# Patient Record
Sex: FEMALE | Race: OTHER | NOT HISPANIC OR LATINO | URBAN - METROPOLITAN AREA
[De-identification: names, ages, dates, MRNs, and addresses within clinical notes are randomized per-mention and may not be internally consistent; named-entity substitution may affect disease eponyms.]

---

## 2021-01-30 ENCOUNTER — EMERGENCY (EMERGENCY)
Facility: HOSPITAL | Age: 41
LOS: 1 days | Discharge: HOME | End: 2021-01-30
Attending: EMERGENCY MEDICINE | Admitting: EMERGENCY MEDICINE
Payer: SELF-PAY

## 2021-01-30 VITALS
OXYGEN SATURATION: 98 % | TEMPERATURE: 98 F | RESPIRATION RATE: 18 BRPM | DIASTOLIC BLOOD PRESSURE: 80 MMHG | SYSTOLIC BLOOD PRESSURE: 154 MMHG | HEART RATE: 68 BPM

## 2021-01-30 PROCEDURE — 71046 X-RAY EXAM CHEST 2 VIEWS: CPT | Mod: 26

## 2021-01-30 PROCEDURE — 99283 EMERGENCY DEPT VISIT LOW MDM: CPT

## 2021-01-30 RX ORDER — LIDOCAINE 4 G/100G
1 CREAM TOPICAL ONCE
Refills: 0 | Status: COMPLETED | OUTPATIENT
Start: 2021-01-30 | End: 2021-01-30

## 2021-01-30 RX ORDER — FAMOTIDINE 10 MG/ML
20 INJECTION INTRAVENOUS ONCE
Refills: 0 | Status: COMPLETED | OUTPATIENT
Start: 2021-01-30 | End: 2021-01-30

## 2021-01-30 RX ADMIN — FAMOTIDINE 20 MILLIGRAM(S): 10 INJECTION INTRAVENOUS at 14:26

## 2021-01-30 RX ADMIN — Medication 30 MILLILITER(S): at 14:26

## 2021-01-30 RX ADMIN — LIDOCAINE 1 PATCH: 4 CREAM TOPICAL at 14:26

## 2021-01-30 NOTE — ED PROVIDER NOTE - ATTENDING CONTRIBUTION TO CARE
I have seen and examined the patient on the patient´s visit date. I have reviewed the note written by Orlando Sexton MD  on that visit day. I have supervised and participated as necessary in the performance of procedures indicated for patient management and was available at all phases of the patient´s visit when needed. We discussed the history, physical exam findings, management plan, and  medical decision making. I have made my additions, exceptions, and revisions within the chart and I agree with H and P as documented in its entirety. The data and my interpretation of any data collected from labs, interventions and imaging appear below as well as my independent medical decision making and considerations    The patient is a 40y Female who has no pertinent past medical history PTED with post back/lat chest wall pain radiating to chest as described  Vital Signs Signs  PE: as described; my additions and exceptions are noted in the chart  DATA:  EKG: NSR@71; ORIANA; EKG reading ? age septal infarct which is new c/w 2016 EKG but on review appears unchanged  LABS: All significant/relevant labs and imaging results present during the time of evaluation have been entered into chart through pullset function and are discussed below    MDM:   IMPRESSION: MSK back pain chest pain; primarily back pain radiating to chest from intercostals vs neck  Considerations; no risk factors for ACS; pain doesn't have any exacerbation with usual cardiac precipitants     PLAN  Reassurance  MSK tx (analgesics=NSAID)  d/c with followup   RTED PRN

## 2021-01-30 NOTE — ED PROVIDER NOTE - NSFOLLOWUPCLINICS_GEN_ALL_ED_FT
[facility]  Neurology  [address] [place] [address] [address]rd [place] [address]  Phone: [phone]  Fax:   Follow Up Time:

## 2021-01-30 NOTE — ED PROVIDER NOTE - CROS ED ENDOCRINE ALL NEG
History Of Present Illness


Patient is a 73 year old female with a past medical history of hypertension and 

A Fib s/p pacemaker placement, who presents complaining of palpitations, onset 

this morning. States she was at rest, eating breakfast when symptoms began. 

Patient reports feeling as if her heart was pounding, with slight chest pain 

and shortness of breath. Currently, she complains of pain to the left shoulder/

arm. 





PMD: Dr. Derek Archuleta 


Time Seen by Provider: 02/11/18 11:26


Chief Complaint (Nursing): Palpitations


History Per: Patient


History/Exam Limitations: no limitations


Onset/Duration Of Symptoms: Sudden Onset (this AM)


Associated Symptoms: Chest Pain





Past Medical History


Reviewed: Historical Data, Nursing Documentation, Vital Signs


Vital Signs: 


 Last Vital Signs











Temp  98.6 F   02/11/18 11:25


 


Pulse  68   02/11/18 11:25


 


Resp  20   02/11/18 11:25


 


BP  159/70 H  02/11/18 11:25


 


Pulse Ox  96   02/11/18 12:29














- Medical History


PMH: Anxiety, Asthma, Atrial Fibrillation, Cardia Arrhythmia, Depression, 

Gastritis, HTN, Hypercholesterolemia


   Denies: Chronic Kidney Disease


Surgical History: Cholecystectomy, Pacemaker (6/16/16)





- [facility] Procedures








CATARAC PHACOEMULS/ASPIR (07/21/15)


INSERT LENS AT CATAR EXT (07/21/15)


INSERT OF MONITOR DEV INTO CHEST SUBCU/FASCIA, PERC APPROACH (06/14/16)


VACCINATION NEC (09/26/14)








Family History: States: Unknown Family Hx





- Social History


Hx Tobacco Use: No


Hx Alcohol Use: No


Hx Substance Use: No





- Immunization History


Hx Tetanus Toxoid Vaccination: No


Hx Influenza Vaccination: Yes


Hx Pneumococcal Vaccination: Yes





Review Of Systems


Except As Marked, All Systems Reviewed And Found Negative.


Constitutional: Negative for: Fever


Cardiovascular: Positive for: Chest Pain, Palpitations


Respiratory: Positive for: Shortness of Breath


Musculoskeletal: Positive for: Shoulder Pain (left)


Neurological: Negative for: Weakness, Headache, Dizziness





Physical Exam





- Physical Exam


Appears: Non-toxic, No Acute Distress


Skin: Normal Color, Warm, Dry


Head: Atraumatic, Normacephalic


Eye(s): bilateral: Normal Inspection, PERRL, EOMI


Nose: Normal


Oral Mucosa: Moist


Chest: Symmetrical


Cardiovascular: Rhythm Regular (and rate), No Murmur


Respiratory: Normal Breath Sounds, No Accessory Muscle Use, No Wheezing


Gastrointestinal/Abdominal: Normal Exam, Soft, No Tenderness


Extremity: Normal ROM, No Deformity, Swelling (+1 pitting edema bilaterally)


Pulses: Left Dorsalis Pedis: Normal, Right Dorsalis Pedis: Normal


Neurological/Psych: Oriented x3, Normal Speech





ED Course And Treatment





- Laboratory Results


Result Diagrams: 


 02/11/18 11:48





 02/11/18 11:48


ECG: Interpreted By Me, Viewed By Me


ECG Interpretation: Normal


Interpretation Of ECG: Normal sinus rhythm, 74 bpm, with no ST depressions or 

elevations


O2 Sat by Pulse Oximetry: 96 (RA)


Pulse Ox Interpretation: Normal





Medical Decision Making


Medical Decision Making: 





Time: 11:41


Paged the office of Dr. Archuleta, and spoke to Nurse Mensah, who recommends that if 

patient needs admission, should go to Dr. Soto's service. 





Time: 11:36


* CMP


* Pro BNP


* Troponin I


* CBC





12:24


Discussed case with Dr. Soto, patient will be hospitalized for observation 

under his service. 





Disposition


Discussed With : Alton Soto Jr.


Counseled Patient/Family Regarding: Studies Performed, Diagnosis





- Disposition


Disposition: HOSPITALIZED


Disposition Time: 12:25


Condition: GUARDED


Forms:  [facility] (English)





- Clinical Impression


Clinical Impression: 


 Chest pain, Palpitations








- Scribe Statement


The provider has reviewed the documentation as recorded by the Lisette Villegas





Provider Attestation: 





All medical record entries made by the Josselynibe were at my direction and 

personally dictated by me. I have reviewed the chart and agree that the record 

accurately reflects my personal performance of the history, physical exam, 

medical decision making, and the department course for this patient. I have 

also personally directed, reviewed, and agree with the discharge instructions 

and disposition.





Decision To Admit





- Pt Status Changed To:


Hospital Disposition Of: Observation





- .


Bed Request Type: Telemetry


Patient Diagnosis: 


 Chest pain, Palpitations
negative...

## 2021-01-30 NOTE — ED PROVIDER NOTE - OBJECTIVE STATEMENT
40 year old Greenlandic speaking female with        ID: [id number] 40 year old Luxembourgish speaking female presenting with back pain x 5mo, worse in last week. States that she has had burning pain in the upper back not improved with tylenol that is now waking her up from sleep in past week, and has spread to L chest/shoulder region. Worse after eating and with lying on back. Denies any numbness, weakness, difficulty walking, urinary/fecal incontinence, fever, cough. Does endorse some SOB that is new. Hx of cholecystectomy.      ID: [id number]

## 2021-01-30 NOTE — ED PROVIDER NOTE - NSFOLLOWUPINSTRUCTIONS_ED_ALL_ED_FT
YOU WERE SEEN IN THE ED FOR: BACK PAIN    YOU WERE PRESCRIBED:  FOLLOW THE INSTRUCTIONS ON THE LABEL/CONTAINER.    FOR PAIN/FEVER, YOU MAY TAKE TYLENOL (acetaminophen) AND/OR IBUPROFEN (advil or motrin). FOLLOW THE INSTRUCTIONS ON THE LABEL/CONTAINER.    PLEASE FOLLOW UP YOUR PRIVATE PHYSICIAN.  -If you do not have a PMD, please call [phone] to find one convenient for you or call our clinic at [phone].    RETURN TO THE EMERGENCY DEPARTMENT IF YOU EXPERIENCE ANY NEW/CONCERNING/WORSENING SYMPTOMS Lo vieron en el Departamento de Emergencias por: dolor de espalda    Puede recoger en la farmacia, sin receta: Pepcid, Pepto-Bismol, Maalox o Tums para un posible alivio adicional del dolor.    Italo un seguimiento con olmedo médico de cabecera y neurólogo. Si no tiene un médico de atención primaria, llame al [phone] para encontrar obinna que le resulte conveniente. En madisyn número podrá localizar un proveedor que acepte olmdeo seguro, así shirley localizar al especialista adecuado para umm necesidades.    Debe regresar al Departamento de Emergencias si siente cualquier síntoma nuevo / que empeora / persiste, incluidos, entre otros: dolor de pecho, dificultad para respirar, pérdida del conocimiento, sangrado, dolor incontrolado, entumecimiento / debilidad de jolie parte del cuerpo.

## 2021-01-30 NOTE — ED ADULT TRIAGE NOTE - CHIEF COMPLAINT QUOTE
pt c/o 1 month of upper back pain and since yesterday she has lt sided chest pain that radiates to under lt arm--no other accompaining symptoms--pt denies N/V or SOB

## 2021-01-30 NOTE — ED PROVIDER NOTE - CLINICAL SUMMARY MEDICAL DECISION MAKING FREE TEXT BOX
40 year old Somali speaking female presenting with back pain x 5mo, worse in last week. Patient is well-appearing here, ambulating without issues, afebrile, VSS. Minimal tenderness on exam, neurologically intact throughout. Suspicion for GERD vs cervical radiculopathy vs musculoskeletal pain. Will give GI cocktail with lido patch, and obtain X-ray to assess for possible PNA given SOB; discussed likely discharge with outpatient GI vs neuro follow up.

## 2021-01-30 NOTE — ED PROVIDER NOTE - CHPI ED SYMPTOMS NEG
no cough/no dizziness/no fever/no nausea/no shortness of breath/no syncope/no vomiting/no chills/no diaphoresis

## 2021-01-30 NOTE — ED PROVIDER NOTE - PHYSICAL EXAMINATION
Gen - NAD; well appearing; A+Ox3   HEENT - NCAT, EOMI  Neck - supple, no LAD or swelling  Resp - CTAB  CV -  RRR  Abd - soft, nondistended, mild epigastric tenderness  Back - some paraspinal tenderness along cervical spine  MSK - 5/5 strength and FROM b/l UE and LE  Extrem - 3+ distal pulses b/L UE and LE; no cyanosis, clubbing, or edema  Skin - no rash or bruising, warm and well perfused  Neuro - full motor strength and sensation through out, normal gait

## 2021-01-30 NOTE — ED PROVIDER NOTE - NS ED ROS FT
Gen: No fever, no weight loss, no fatigue  CV: +chest pain, no palpitations  HEENT: No sore throat, no hoarseness  Skin: No rash, no color changes  Resp: No SOB, no cough  Endo: No sensitivity to heat or cold, no appetite changes  GI: No constipation, no diarrhea, no nausea, no vomiting  Msk: +back pain, no LE swelling, no extremity pain  : No dysuria, no increased frequency  Neuro: No LOC, no weakness, no numbness

## 2021-01-30 NOTE — ED PROVIDER NOTE - PATIENT PORTAL LINK FT
You can access the FollowMyHealth Patient Portal offered by [facility] by registering at the following website: [email or web address]. By joining [employer]’s FollowMyHealth portal, you will also be able to view your health information using other applications (apps) compatible with our system.